# Patient Record
(demographics unavailable — no encounter records)

---

## 2024-10-21 NOTE — HISTORY OF PRESENT ILLNESS
[FreeTextEntry1] : Yeimy is a 19 month old male who presents with his mother for follow up of intoeing and bow legging. Mother reports that the child has been walking with his feet turned inwards since he started ambulating independently at around 9 months of age. Since the last visit, he bow legging has improved. Of note, mother states she was treated with brace for  bow legging. He is otherwise active and health child born full term via . Here for orthopedic evaluation and management.   The patient's HPI was reviewed thoroughly with patient and parent. The patient's parent has acted as an independent historian regarding the above information due to the unreliable nature of the history obtained from the patient.

## 2024-10-21 NOTE — REASON FOR VISIT
Follow Up] : a follow up visit [Mother] : mother [FreeTextEntry1] : intoeing and bow legged

## 2024-10-21 NOTE — PHYSICAL EXAM
[FreeTextEntry1] : Well-developed, well-nourished 19-month-old male in no acute distress. He is awake and alert and appears to be resting comfortably.   The head is normocephalic, atraumatic with full range of motion of the cervical spine with no pain. Eyes are clear with no sclera abnormalities. Ears, nose and mouth are clear. The child is moving all limbs spontaneously. Full range of motion of bilateral upper extremities. The motor exam is 5/5 of bilateral shoulders, elbows, wrists, and hands. The pulses are 2+ at both wrists. The child has full range of motion of bilateral hips, knees, ankles, and feet with motor exam of 5/5 of both lower extremities. No apparent limb length discrepancy. Sensation is grossly intact in bilateral upper and lower extremities. Pulses are 2+ at both feet. There are no palpable masses, warmth, or tenderness in bilateral upper and lower extremities. Examination of bilateral lower extremities reveals wide symmetric abduction of bilateral hips to greater than 60. Thigh-foot angles approximately -15 degrees bilaterally. Both patellas are properly located. Full flexion and extension of the knees, no locking. Meniscal maneuvers are negative. Both feet are well aligned, they're flexible, no calluses. No signs of metatarsus adductus. No cavus. No toe deformities.  Bilateral knees with full range of motion. Both knees are clinically stable. Negative Galeazzi. Ther is 1 finger breadth between two knees.

## 2024-10-21 NOTE — END OF VISIT
[FreeTextEntry3] :     Saw and examined patient; the above is an accurate documentation of my words and actions.   Lisa Ramires MD [facility] Pediatric Orthopedic Surgery

## 2024-10-21 NOTE — ASSESSMENT
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  Lower extremity alignment should improve as child ages. Parent should notice significant improvement in intoeing by age 3 but this will continue to improve until about age 8 years. Range of normal for lower extremity alignment has been discussed. Frequent falls at this age are common. Child does not develop balance and coordination until about age 3 years. Child may continue to participate in activities as tolerated. Return for followup in 12 months for evaluation of bow legging. If there is any clinical concern we may obtain XR leg lengths. All questions answered. Family and patient verbalize understanding of the plan.   Shivani ORTEGA PA-C have acted as scribe and documented the above for Dr. Ramires